# Patient Record
Sex: MALE | Race: WHITE | URBAN - METROPOLITAN AREA
[De-identification: names, ages, dates, MRNs, and addresses within clinical notes are randomized per-mention and may not be internally consistent; named-entity substitution may affect disease eponyms.]

---

## 2018-12-28 ENCOUNTER — ANCILLARY PROCEDURE (OUTPATIENT)
Dept: GENERAL RADIOLOGY | Facility: CLINIC | Age: 78
End: 2018-12-28
Attending: FAMILY MEDICINE
Payer: MEDICARE

## 2018-12-28 ENCOUNTER — OFFICE VISIT (OUTPATIENT)
Dept: URGENT CARE | Facility: URGENT CARE | Age: 78
End: 2018-12-28
Payer: MEDICARE

## 2018-12-28 VITALS
WEIGHT: 151 LBS | SYSTOLIC BLOOD PRESSURE: 120 MMHG | DIASTOLIC BLOOD PRESSURE: 72 MMHG | HEART RATE: 88 BPM | OXYGEN SATURATION: 98 % | TEMPERATURE: 98.8 F | BODY MASS INDEX: 25.16 KG/M2 | HEIGHT: 65 IN

## 2018-12-28 DIAGNOSIS — L03.031 CELLULITIS OF SECOND TOE, RIGHT: ICD-10-CM

## 2018-12-28 DIAGNOSIS — M79.674 PAIN OF TOE OF RIGHT FOOT: Primary | ICD-10-CM

## 2018-12-28 DIAGNOSIS — F02.80 LATE ONSET ALZHEIMER'S DISEASE WITHOUT BEHAVIORAL DISTURBANCE (H): ICD-10-CM

## 2018-12-28 DIAGNOSIS — M10.071 ACUTE IDIOPATHIC GOUT INVOLVING TOE OF RIGHT FOOT: ICD-10-CM

## 2018-12-28 DIAGNOSIS — G30.1 LATE ONSET ALZHEIMER'S DISEASE WITHOUT BEHAVIORAL DISTURBANCE (H): ICD-10-CM

## 2018-12-28 PROCEDURE — 99204 OFFICE O/P NEW MOD 45 MIN: CPT | Performed by: FAMILY MEDICINE

## 2018-12-28 PROCEDURE — 73660 X-RAY EXAM OF TOE(S): CPT | Mod: RT

## 2018-12-28 RX ORDER — INDOMETHACIN 25 MG/1
25 CAPSULE ORAL 2 TIMES DAILY WITH MEALS
Qty: 40 CAPSULE | Refills: 0 | Status: SHIPPED | OUTPATIENT
Start: 2018-12-28 | End: 2019-01-17

## 2018-12-28 RX ORDER — CEPHALEXIN 500 MG/1
500 CAPSULE ORAL 3 TIMES DAILY
Qty: 30 CAPSULE | Refills: 0 | Status: SHIPPED | OUTPATIENT
Start: 2018-12-28 | End: 2019-01-07

## 2018-12-28 ASSESSMENT — MIFFLIN-ST. JEOR: SCORE: 1321.81

## 2018-12-29 NOTE — PROGRESS NOTES
SUBJECTIVE:  Chief Complaint   Patient presents with     Swelling     swelling in right toes x 2 days, some swelling of the right foot, having pain, applied some heat, and some spray     Dewayne Smith is a 78 year old male who presents with a chief complaint of right 2nd/ 3rd toe pain, swelling, tenderness, redness and decreased range of motion.  Symptoms began 2 day(s) ago, are moderate and worsening and constant  has a history of gout - with often attacks in his toes-  His podiatrist frequently has given steroid injections in the toes to control past attacks  Context:  Mr. Smith has alzheimers,  He is unable to give clear history of his symptoms or if there was trauma,  He is able to indicate if the area is painful to palpation/ movement  There was no injury noted to the painful area according to his spouse-  Though injury could be possible     Pain exacerbated by walking, movement and flexion/extension Relieved by nothing.  He treated it initially with heat.          Past Medical History:   Diagnosis Date     Basal cell cancer      GERD (gastroesophageal reflux disease)      Rotator cuff tendonitis      Patient Active Problem List   Diagnosis     Alzheimer's disease     Anxiety     Cognitive disorder     Acute cholecystitis       ALLERGIES:  Patient has no known allergies.      Current Outpatient Medications on File Prior to Visit:  aspirin 81 MG EC tablet Take 81 mg by mouth every evening   ATENOLOL PO Take 25 mg by mouth every evening    cholecalciferol (VITAMIN D3) 1000 UNIT tablet Take 1,000 Units by mouth every morning   esomeprazole (NEXIUM) 40 MG capsule Take 40 mg by mouth every morning One hour before meals   MILK THISTLE Take 1 tablet by mouth daily (with breakfast)    Multiple Vitamins-Minerals (CENTRUM PO) Take 1 tablet by mouth daily (with breakfast)    Omega-3 Fatty Acids 1200 MG capsule Take 1 capsule by mouth daily (with breakfast)      No current facility-administered medications on file prior to  "visit.     Social History     Tobacco Use     Smoking status: Never Smoker     Smokeless tobacco: Never Used   Substance Use Topics     Alcohol use: Yes     Comment: at least 4 oz scotch daily       Family History   Family history unknown: Yes         Review Of Systems    Constitutional:  Negative for fevers, chills, fatigue  Skin: negative for rash or lesions  Eyes: negative for eye pain, visual changes  Ears/Nose/Throat: negative for earache  , sinus symptoms, sore throat  Back: negative for pain with back movement,  CVA pain  Musculoskeletal: right foot pain in 2nd and 3rd toes  Neurologic: negative for generalized or local weakness or incoordination  Psychiatric:  Disoriented to place and time, tangential speech  Hematologic/Lymphatic/Immunologic: negative for allergies and swollen nodes  Endocrine: negative for thyroid disorder and diabetes    EXAM:   /72 (BP Location: Right arm, Patient Position: Chair, Cuff Size: Adult Regular)   Pulse 88   Temp 98.8  F (37.1  C) (Oral)   Ht 1.651 m (5' 5\")   Wt 68.5 kg (151 lb)   SpO2 98%   BMI 25.13 kg/m      right Foot  No injury to the skin noted- though toes have some dry cracked skin  No contusions noted  Redness, tenderness, warmth and swelling noted in the region of the  PIP and DIP joint of the 2nd toe and little of PIP region 3rd toe  No pain or tenderness with palpation and range of motion of the remaining toes, MTP joints, or  ankle joint of the right foot.  No pain or tenderness with palpation of the contralateral foot, ankle     GENERAL APPEARANCE: alert and moderate distress-  Unable to communicate his symptoms-  Speech is tangential  EXTREMITIES: peripheral pulses normal  NEURO: Normal strength and tone, sensory exam grossly normal, mentation - confused, and speech normal, but tangential     EYES: EOMI,   conjunctiva clear  HENT: External ears with no swelling or lesions   Nose and lips without  Swelling, ulcers, erythema or lesions  NECK: normal " pain free ROM  RESP: no labored respirations, no tachypnea  GI-  No discomfort, soft          X-RAY was done.-  No fracture noted of the toes at area of inflammation   x-ray read by me Luz Acharya MD    ASSESSMENT:  Pain of toe of right foot     - XR Toe Right G/E 2 Views    Cellulitis of second toe, right    Unclear if infection or gout is etiology of redness and swelling-  Steroid injections could increase risk of infection-  Will treat for both gout and infection     - cephALEXin (KEFLEX) 500 MG capsule; Take 1 capsule (500 mg) by mouth 3 times daily for 10 days    Acute idiopathic gout involving toe of right foot     - indomethacin (INDOCIN) 25 MG capsule; Take 1 capsule (25 mg) by mouth 2 times daily (with meals) for 40 doses     Acetaminophen/ Ibuprofen as an alternative for pain  Given patient education materials about gout      Cane  to assist with ambulating if needed until pain subsides    Follow-up with their usual podiatrist for ongoing management- no referral needed      Late onset Alzheimer's disease without behavioral disturbance     Since patient is a poor historian due to Alzheimer's,  Unable to report if there was trauma,  Unable to describe symptoms,  Will empirically treat for both infection and gout

## 2018-12-29 NOTE — PATIENT INSTRUCTIONS
Patient Education     Gout    Gout is an inflammation of a joint due to a build-up of gout crystals in the joint fluid. This occurs when there is an excess of uric acid (a normal waste product) in the body. Uric acid builds up in the body when the kidneys are unable to filter enough of it from the blood. This may occur with age. It is also associated with kidney disease. Gout occurs more often in people with obesity, diabetes, high blood pressure, or high levels of fats in the blood. It may run in families. Gout tends to come and go. A flare up of gout is called an attack. Drinking alcohol or eating certain foods (such as shellfish or foods with additives such as high-fructose corn syrup) may increase uric acid levels in the blood and cause a gout attack.  During a gout attack, the affected joint may become a hot, red, swollen and painful. If you have had one attack of gout, you are likely to have another. An attack of gout can be treated with medicine. If these attacks become frequent, a daily medicine may be prescribed to help the kidneys remove uric acid from the body.  Home care  During a gout attack:    Rest painful joints. If gout affects the joints of your foot or leg, you may want to use crutches for the first few days to keep from bearing weight on the affected joint.    When sitting or lying down, raise the painful joint to a level higher than your heart.    Apply an ice pack (ice cubes in a plastic bag wrapped in a thin towel) over the injured area for 20 minutes every 1 to 2 hours the first day for pain relief. Continue this 3 to 4 times a day for swelling and pain.    Avoid alcohol and foods listed below (see Preventing attacks) during a gout attack. Drink extra fluid to help flush the uric acid through your kidneys.    If you were prescribed a medicine to treat gout, take it as your healthcare provider has instructed. Don't skip doses.    Take anti-inflammatory medicine as directed.     If pain  medicines have been prescribed, take them exactly as directed.    Preventing attacks    Minimize or avoid alcohol use. Excess alcohol intake can cause a gout attack.    Limit these foods and beverages:  ? Organ meats, such as kidneys and liver  ? Certain seafoods (anchovies, sardines, shrimp, scallops, herring, mackerel)  ? Wild game, meat extracts and meat gravies  ? Foods and beverages sweetened with high-fructose corn syrup, such as sodas    Eat a healthy diet including low-fat and nonfat dairy, whole grains, and vegetables.    If you are overweight, talk to your healthcare provider about a weight reduction plan. Avoid fasting or extreme low calorie diets (less than 900 calories per day). This will increase uric acid levels in the body.    If you have diabetes or high blood pressure, work with your doctor to manage these conditions.    Protect the joint from injury. Trauma can trigger a gout attack.  Follow-up care  Follow up with your healthcare provider, or as advised.   When to seek medical advice  Call your healthcare provider if you have any of the following:    Fever over 100.4 F (38. C) with worsening joint pain    Increasing redness around the joint    Pain developing in another joint    Repeated vomiting, abdominal pain, or blood in the vomit or stool (black or red color)  Date Last Reviewed: 3/1/2017    0442-6976 [employer]. [address] [place] [address]. All rights reserved. This information is not intended as a substitute for professional medical care. Always follow your healthcare professional's instructions.           Patient Education     Cellulitis  Cellulitis is an infection of the deep layers of skin. A break in the skin, such as a cut or scratch, can let bacteria under the skin. If the bacteria get to deep layers of the skin, it can be serious. If not treated, cellulitis can get into the bloodstream and lymph nodes. The infection can then spread throughout the body.  This causes serious illness.  Cellulitis causes the affected skin to become red, swollen, warm, and sore. The reddened areas have a visible border. An open sore may leak fluid (pus). You may have a fever, chills, and pain.  Cellulitis is treated with antibiotics taken for 7 to 10 days. An open sore may be cleaned and covered with cool wet gauze. Symptoms should get better 1 to 2 days after treatment is started. Make sure to take all the antibiotics for the full number of days until they are gone. Keep taking the medicine even if your symptoms go away.  Home care  Follow these tips:    Limit the use of the part of your body with cellulitis.     If the infection is on your leg, keep your leg raised while sitting. This will help to reduce swelling.    Take all of the antibiotic medicine exactly as directed until it is gone. Do not miss any doses, especially during the first 7 days. Don t stop taking the medicine when your symptoms get better.    Keep the affected area clean and dry.    Wash your hands with soap and warm water before and after touching your skin. Anyone else who touches your skin should also wash his or her hands. Don't share towels.  Follow-up care  Follow up with your healthcare provider, or as advised. If your infection does not go away on the first antibiotic, your healthcare provider will prescribe a different one.  When to seek medical advice  Call your healthcare provider right away if any of these occur:    Red areas that spread    Swelling or pain that gets worse    Fluid leaking from the skin (pus)    Fever higher of 100.4  F (38.0  C) or higher after 2 days on antibiotics  Date Last Reviewed: 9/1/2016 2000-2018 [employer]. [address] [place] [address]. All rights reserved. This information is not intended as a substitute for professional medical care. Always follow your healthcare professional's instructions.